# Patient Record
Sex: MALE | Race: WHITE | Employment: FULL TIME | RURAL
[De-identification: names, ages, dates, MRNs, and addresses within clinical notes are randomized per-mention and may not be internally consistent; named-entity substitution may affect disease eponyms.]

---

## 2018-09-26 PROBLEM — R97.20 ELEVATED PSA: Status: ACTIVE | Noted: 2018-09-26

## 2020-08-18 PROBLEM — C61 PROSTATE CANCER (HCC): Status: ACTIVE | Noted: 2020-08-18

## 2020-12-22 PROBLEM — D12.3 ADENOMATOUS POLYP OF TRANSVERSE COLON: Status: ACTIVE | Noted: 2019-03-01

## 2020-12-22 PROBLEM — Z12.11 SCREENING FOR COLON CANCER: Status: ACTIVE | Noted: 2019-03-13

## 2020-12-22 PROBLEM — Z86.010 HISTORY OF ADENOMATOUS POLYP OF COLON: Status: ACTIVE | Noted: 2019-03-13

## 2022-02-23 ENCOUNTER — APPOINTMENT (OUTPATIENT)
Dept: CT IMAGING | Age: 77
End: 2022-02-23
Attending: FAMILY MEDICINE
Payer: MEDICARE

## 2022-02-23 ENCOUNTER — HOSPITAL ENCOUNTER (EMERGENCY)
Age: 77
Discharge: HOME | End: 2022-02-23
Attending: FAMILY MEDICINE
Payer: MEDICARE

## 2022-02-23 VITALS
HEIGHT: 74 IN | RESPIRATION RATE: 18 BRPM | HEART RATE: 81 BPM | BODY MASS INDEX: 28.23 KG/M2 | SYSTOLIC BLOOD PRESSURE: 128 MMHG | TEMPERATURE: 98.1 F | DIASTOLIC BLOOD PRESSURE: 83 MMHG | WEIGHT: 220 LBS | OXYGEN SATURATION: 99 %

## 2022-02-23 DIAGNOSIS — M54.41 ACUTE MIDLINE LOW BACK PAIN WITH RIGHT-SIDED SCIATICA: Primary | ICD-10-CM

## 2022-02-23 LAB
ALBUMIN SERPL-MCNC: 4 G/DL (ref 3.5–5)
ALBUMIN/GLOB SERPL: 1.1 {RATIO} (ref 1.1–2.2)
ALP SERPL-CCNC: 94 U/L (ref 45–117)
ALT SERPL-CCNC: 32 U/L (ref 12–78)
ANION GAP SERPL CALC-SCNC: 9 MMOL/L (ref 5–15)
AST SERPL-CCNC: 30 U/L (ref 15–37)
BASOPHILS # BLD: 0 K/UL (ref 0–0.1)
BASOPHILS NFR BLD: 0 % (ref 0–1)
BILIRUB SERPL-MCNC: 0.8 MG/DL (ref 0.2–1)
BUN SERPL-MCNC: 25 MG/DL (ref 6–20)
BUN/CREAT SERPL: 21 (ref 12–20)
CALCIUM SERPL-MCNC: 9.3 MG/DL (ref 8.5–10.1)
CHLORIDE SERPL-SCNC: 105 MMOL/L (ref 97–108)
CO2 SERPL-SCNC: 23 MMOL/L (ref 21–32)
CREAT SERPL-MCNC: 1.17 MG/DL (ref 0.7–1.3)
DIFFERENTIAL METHOD BLD: ABNORMAL
EOSINOPHIL # BLD: 0.1 K/UL (ref 0–0.4)
EOSINOPHIL NFR BLD: 1 % (ref 0–7)
ERYTHROCYTE [DISTWIDTH] IN BLOOD BY AUTOMATED COUNT: 12.6 % (ref 11.5–14.5)
GLOBULIN SER CALC-MCNC: 3.5 G/DL (ref 2–4)
GLUCOSE SERPL-MCNC: 130 MG/DL (ref 65–100)
HCT VFR BLD AUTO: 37.8 % (ref 36.6–50.3)
HGB BLD-MCNC: 13.8 G/DL (ref 12.1–17)
IMM GRANULOCYTES # BLD AUTO: 0 K/UL (ref 0–0.04)
IMM GRANULOCYTES NFR BLD AUTO: 1 % (ref 0–0.5)
LYMPHOCYTES # BLD: 1 K/UL (ref 0.8–3.5)
LYMPHOCYTES NFR BLD: 16 % (ref 12–49)
MCH RBC QN AUTO: 34.3 PG (ref 26–34)
MCHC RBC AUTO-ENTMCNC: 36.5 G/DL (ref 30–36.5)
MCV RBC AUTO: 94 FL (ref 80–99)
MONOCYTES # BLD: 0.7 K/UL (ref 0–1)
MONOCYTES NFR BLD: 10 % (ref 5–13)
NEUTS SEG # BLD: 4.7 K/UL (ref 1.8–8)
NEUTS SEG NFR BLD: 72 % (ref 32–75)
NRBC # BLD: 0 K/UL (ref 0–0.01)
NRBC BLD-RTO: 0 PER 100 WBC
PLATELET # BLD AUTO: 168 K/UL (ref 150–400)
PMV BLD AUTO: 10.7 FL (ref 8.9–12.9)
POTASSIUM SERPL-SCNC: 4.1 MMOL/L (ref 3.5–5.1)
PROT SERPL-MCNC: 7.5 G/DL (ref 6.4–8.2)
RBC # BLD AUTO: 4.02 M/UL (ref 4.1–5.7)
SODIUM SERPL-SCNC: 137 MMOL/L (ref 136–145)
WBC # BLD AUTO: 6.5 K/UL (ref 4.1–11.1)

## 2022-02-23 PROCEDURE — 74011250637 HC RX REV CODE- 250/637: Performed by: FAMILY MEDICINE

## 2022-02-23 PROCEDURE — 74011250636 HC RX REV CODE- 250/636: Performed by: FAMILY MEDICINE

## 2022-02-23 PROCEDURE — 96374 THER/PROPH/DIAG INJ IV PUSH: CPT

## 2022-02-23 PROCEDURE — 74011000250 HC RX REV CODE- 250: Performed by: FAMILY MEDICINE

## 2022-02-23 PROCEDURE — 74176 CT ABD & PELVIS W/O CONTRAST: CPT

## 2022-02-23 PROCEDURE — 99284 EMERGENCY DEPT VISIT MOD MDM: CPT

## 2022-02-23 PROCEDURE — 80053 COMPREHEN METABOLIC PANEL: CPT

## 2022-02-23 PROCEDURE — 36415 COLL VENOUS BLD VENIPUNCTURE: CPT

## 2022-02-23 PROCEDURE — 85025 COMPLETE CBC W/AUTO DIFF WBC: CPT

## 2022-02-23 PROCEDURE — 96376 TX/PRO/DX INJ SAME DRUG ADON: CPT

## 2022-02-23 RX ORDER — ACETAMINOPHEN 325 MG/1
325 TABLET ORAL
COMMUNITY

## 2022-02-23 RX ORDER — OXYCODONE AND ACETAMINOPHEN 5; 325 MG/1; MG/1
1 TABLET ORAL
Status: COMPLETED | OUTPATIENT
Start: 2022-02-23 | End: 2022-02-23

## 2022-02-23 RX ORDER — OXYCODONE AND ACETAMINOPHEN 5; 325 MG/1; MG/1
1 TABLET ORAL
Qty: 12 TABLET | Refills: 0 | Status: SHIPPED | OUTPATIENT
Start: 2022-02-23 | End: 2022-02-26

## 2022-02-23 RX ORDER — CLOBETASOL PROPIONATE 0.5 MG/G
OINTMENT TOPICAL
COMMUNITY
Start: 2021-03-15

## 2022-02-23 RX ORDER — MELATONIN
1000 DAILY
COMMUNITY

## 2022-02-23 RX ORDER — MORPHINE SULFATE 4 MG/ML
4 INJECTION INTRAVENOUS
Status: COMPLETED | OUTPATIENT
Start: 2022-02-23 | End: 2022-02-23

## 2022-02-23 RX ORDER — SODIUM CHLORIDE 0.9 % (FLUSH) 0.9 %
5-10 SYRINGE (ML) INJECTION ONCE
Status: COMPLETED | OUTPATIENT
Start: 2022-02-23 | End: 2022-02-23

## 2022-02-23 RX ADMIN — OXYCODONE AND ACETAMINOPHEN 1 TABLET: 5; 325 TABLET ORAL at 23:39

## 2022-02-23 RX ADMIN — MORPHINE SULFATE 4 MG: 4 INJECTION INTRAVENOUS at 22:31

## 2022-02-23 RX ADMIN — SODIUM CHLORIDE, PRESERVATIVE FREE 10 ML: 5 INJECTION INTRAVENOUS at 21:08

## 2022-02-23 RX ADMIN — MORPHINE SULFATE 4 MG: 4 INJECTION INTRAVENOUS at 21:08

## 2022-02-24 NOTE — ED PROVIDER NOTES
[address] [place] [address]   EMERGENCY DEPARTMENT          Date: 2/23/2022  Patient: Micaela Osborne MRN: [id number]  SSN: [id number]    YOB: 1945  Age: 77 y.o. Sex: male      PCP: Leslye Shah DO  The patient arrived by ambulance and is accompanied by spouse. History of Presenting Illness     Chief Complaint   Patient presents with    LOW BACK PAIN       History Provided By: Patient and Patient's Wife    HPI: Micaela Osborne is a 77 y.o. male, pmhx atrial fibrillation, prostate cancer status post radiation therapy, who presents via ambulance to the ED c/o low back pain. Patient has had back pain for several weeks but is never been severe tonight. He states that this pain is usually fairly well controlled with over-the-counter medications. Tonight he was standing at a bar getting a drink, turned around and felt immediate pain in his back with radiation into his legs. He was unable to walk easily. He was assisted back to his truck and drove home. At home the back pain continued got somewhat worse. He had no incontinence of urine or stool or inability to urinate. He had no numbness or tingling in the lower extremities. Pain is brought to hospital by ambulance due to severe back pain. Any movement causes excruciating pain he states. He has had no fever, sweats, chills, there is no history of IV drug use. Patient is on Eliquis for his atrial fibrillation but has had no bleeding problems recently. No abdominal pain is noted. No chest pain heaviness pressure or shortness of breath is noted. No recent fall or overuse is noted. Past History     Past Medical History:   Diagnosis Date    A-fib ([address]     Acid reflux     Cardiac abnormality     Chronic back pain     Elevated PSA     Hyperlipemia        Past Surgical History:   Procedure Laterality Date    HX VASECTOMY         No family history on file.     Social History     Tobacco Use    Smoking status: Current Every Day Smoker     Types: Cigars    Smokeless tobacco: Never Used   Substance Use Topics    Alcohol use: Yes     Alcohol/week: 4.0 standard drinks     Types: 4 Glasses of wine per week    Drug use: No       Allergies   Allergen Reactions    Finasteride Rash       Current Outpatient Medications   Medication Sig Dispense Refill    clobetasoL (TEMOVATE) 0.05 % ointment       oxyCODONE-acetaminophen (Percocet) 5-325 mg per tablet Take 1 Tablet by mouth every six (6) hours as needed for Pain for up to 3 days. Max Daily Amount: 4 Tablets. 12 Tablet 0    leuprolide acetate (ELIGARD SC) by SubCUTAneous route.  acetaminophen (TYLENOL) 325 mg tablet Take 325 mg by mouth every four (4) hours as needed.  cholecalciferol (VITAMIN D3) (1000 Units /25 mcg) tablet Take 1,000 Units by mouth daily.  flecainide (TAMBOCOR) 50 mg tablet       metoprolol succinate (TOPROL-XL) 50 mg XL tablet 25 mg.      apixaban (Eliquis) 5 mg tablet Take  by mouth two (2) times a day.  esomeprazole (NEXIUM) 20 mg capsule 20 mg.      rosuvastatin (CRESTOR) 5 mg tablet TAKE 1 TAB BY MOUTH EVERY NIGHT AT BEDTIME  3         Review of Systems       Review of Systems   Constitutional: Negative for activity change, chills, diaphoresis and fever. HENT: Negative. Respiratory: Negative for chest tightness and shortness of breath. Cardiovascular: Negative for chest pain and palpitations. Gastrointestinal: Negative for abdominal pain, nausea and vomiting. Genitourinary: Negative for difficulty urinating, dysuria, flank pain, frequency and hematuria. Musculoskeletal: Positive for back pain, gait problem and myalgias. Skin: Negative. Neurological: Negative for weakness and numbness. Hematological: Does not bruise/bleed easily. Physical Exam     Physical Exam  Vitals and nursing note reviewed. Constitutional:       General: He is in acute distress. Appearance: Normal appearance. He is normal weight.  He is not toxic-appearing or diaphoretic. HENT:      Head: Normocephalic and atraumatic. Right Ear: External ear normal.      Left Ear: External ear normal.      Nose: Nose normal.      Mouth/Throat:      Mouth: Mucous membranes are moist.   Eyes:      Extraocular Movements: Extraocular movements intact. Conjunctiva/sclera: Conjunctivae normal.      Pupils: Pupils are equal, round, and reactive to light. Cardiovascular:      Rate and Rhythm: Normal rate and regular rhythm. Pulses: Normal pulses. Heart sounds: Normal heart sounds. No murmur heard. No friction rub. No gallop. Pulmonary:      Effort: Pulmonary effort is normal.      Breath sounds: Normal breath sounds. Abdominal:      General: Bowel sounds are normal. There is no distension. Palpations: Abdomen is soft. Tenderness: There is no abdominal tenderness. There is no guarding. Musculoskeletal:         General: Tenderness present. No signs of injury. Cervical back: Normal range of motion and neck supple. No rigidity or tenderness. Right lower leg: No edema. Left lower leg: No edema. Comments: Back with straight with some loss of lordosis and pain to palpation over the L to L3 area. Range of motion is limited secondary to pain. Any motion of patient and cause a lot of pain is area. Lower extremities had 2+ DTRs in the ankles and knees intact sensation in the saddle area and throughout both lower extremities was noted. Straight leg raise on the right slightly +30 degrees, negative on the left. Pain not worse with forced dorsiflexion of the ankle. 2+ DP and PT pulses in the legs noted. No bruising or erythema of the back is noted. Skin:     General: Skin is warm and dry. Capillary Refill: Capillary refill takes less than 2 seconds. Findings: No erythema or rash. Neurological:      Mental Status: He is alert and oriented to person, place, and time.       Cranial Nerves: No cranial nerve deficit. Sensory: No sensory deficit. Motor: No weakness. Gait: Gait abnormal.      Deep Tendon Reflexes: Reflexes abnormal.   Psychiatric:         Mood and Affect: Mood normal.         Behavior: Behavior normal.         Thought Content: Thought content normal.         Judgment: Judgment normal.           Diagnostic Study Results     Labs -     Recent Results (from the past 48 hour(s))   CBC WITH AUTOMATED DIFF    Collection Time: 02/23/22  9:05 PM   Result Value Ref Range    WBC 6.5 4.1 - 11.1 K/uL    RBC 4.02 (L) 4.10 - 5.70 M/uL    HGB 13.8 12.1 - 17.0 g/dL    HCT 37.8 36.6 - 50.3 %    MCV 94.0 80.0 - 99.0 FL    MCH 34.3 (H) 26.0 - 34.0 PG    MCHC 36.5 30.0 - 36.5 g/dL    RDW 12.6 11.5 - 14.5 %    PLATELET 286 748 - 329 K/uL    MPV 10.7 8.9 - 12.9 FL    NRBC 0.0 0  WBC    ABSOLUTE NRBC 0.00 0.00 - 0.01 K/uL    NEUTROPHILS 72 32 - 75 %    LYMPHOCYTES 16 12 - 49 %    MONOCYTES 10 5 - 13 %    EOSINOPHILS 1 0 - 7 %    BASOPHILS 0 0 - 1 %    IMMATURE GRANULOCYTES 1 (H) 0.0 - 0.5 %    ABS. NEUTROPHILS 4.7 1.8 - 8.0 K/UL    ABS. LYMPHOCYTES 1.0 0.8 - 3.5 K/UL    ABS. MONOCYTES 0.7 0.0 - 1.0 K/UL    ABS. EOSINOPHILS 0.1 0.0 - 0.4 K/UL    ABS. BASOPHILS 0.0 0.0 - 0.1 K/UL    ABS. IMM. GRANS. 0.0 0.00 - 0.04 K/UL    DF AUTOMATED     METABOLIC PANEL, COMPREHENSIVE    Collection Time: 02/23/22  9:05 PM   Result Value Ref Range    Sodium 137 136 - 145 mmol/L    Potassium 4.1 3.5 - 5.1 mmol/L    Chloride 105 97 - 108 mmol/L    CO2 23 21 - 32 mmol/L    Anion gap 9 5 - 15 mmol/L    Glucose 130 (H) 65 - 100 mg/dL    BUN 25 (H) 6 - 20 MG/DL    Creatinine 1.17 0.70 - 1.30 MG/DL    BUN/Creatinine ratio 21 (H) 12 - 20      GFR est AA >60 >60 ml/min/1.73m2    GFR est non-AA >60 >60 ml/min/1.73m2    Calcium 9.3 8.5 - 10.1 MG/DL    Bilirubin, total 0.8 0.2 - 1.0 MG/DL    ALT (SGPT) 32 12 - 78 U/L    AST (SGOT) 30 15 - 37 U/L    Alk.  phosphatase 94 45 - 117 U/L    Protein, total 7.5 6.4 - 8.2 g/dL    Albumin 4.0 3.5 - 5.0 g/dL    Globulin 3.5 2.0 - 4.0 g/dL    A-G Ratio 1.1 1.1 - 2.2          Radiologic Studies -   CT Results  (Last 48 hours)               02/23/22 2130  CT ABD PELV WO CONT Final result    Impression:  No acute findings seen. Narrative:  EXAM: CT ABD PELV WO CONT       INDICATION: prostate ca on eliquis, with low back pain, ? lumbar mets, hematoma       COMPARISON:       IV CONTRAST: None. ORAL CONTRAST: None       TECHNIQUE:    Thin axial images were obtained through the abdomen and pelvis. Coronal and   sagittal reformats were generated. CT dose reduction was achieved through use of   a standardized protocol tailored for this examination and automatic exposure   control for dose modulation. The absence of intravenous contrast material reduces the sensitivity for   evaluation of the vasculature and solid organs. FINDINGS:    LOWER THORAX: No significant abnormality in the incidentally imaged lower chest.   LIVER: No mass. BILIARY TREE: Cholelithiasis in a nondistended gallbladder. . CBD is not dilated. SPLEEN: within normal limits. PANCREAS: No focal abnormality. ADRENALS: Unremarkable. KIDNEYS/URETERS: No calculus or hydronephrosis. STOMACH: Unremarkable. SMALL BOWEL: No dilatation or wall thickening. COLON: No dilatation or wall thickening. Sigmoid diverticulosis of. APPENDIX:   PERITONEUM: No ascites or pneumoperitoneum. RETROPERITONEUM: No lymphadenopathy or aortic aneurysm. REPRODUCTIVE ORGANS:   URINARY BLADDER: No mass or calculus. BONES: No destructive bone lesion. ABDOMINAL WALL: No mass or hernia. ADDITIONAL COMMENTS: N/A               CT ABD PELV WO CONT   Final Result   No acute findings seen.             Procedures     Procedures      Medical Decision Making     Records Reviewed: Nursing notes past medical records x-rays and lab studies         Vital Signs  Patient Vitals for the past 24 hrs:   Pulse Resp BP SpO2   02/23/22 2242 81 18 128/83 99 % Pulse Oximetry Analysis - 99% on RA    I am the first provider for this patient. I reviewed the vital signs, available nursing notes, past medical history, past surgical history, family history and social history, performed a physical exam and reviewed labs and xrays from this visit    MDM  Number of Diagnoses or Management Options  Acute midline low back pain with right-sided sciatica: established, worsening     Amount and/or Complexity of Data Reviewed  Clinical lab tests: ordered and reviewed  Tests in the radiology section of CPT®: ordered  Tests in the medicine section of CPT®: ordered and reviewed  Obtain history from someone other than the patient: yes (Wife)  Review and summarize past medical records: yes    Risk of Complications, Morbidity, and/or Mortality  Presenting problems: high  Diagnostic procedures: high  Management options: moderate  General comments: Differential includes metastatic disease, fracture, sprain, strain, herniated disc, spinal cord hematoma or abscess,    Patient Progress  Patient progress: improved    ED Course     Initial assessment performed. The patients presenting problems have been discussed, and they are in agreement with the care plan formulated and outlined with them. I have encouraged them to ask questions as they arise throughout their visit. ED Course as of 02/24/22 2211 Wed Feb 23, 2022 2226  review is negative for medication prescription. [PS]   9166 CBC interval is normal with white count 6.5, hemoglobin 13.8 platelet count 613, CMP normal except for glucose of 130 and BUN of 25. Sodium was 137, potassium 4.1, CO2 is 23, creatinine 1.17, liver function studies were normal.  CT revealed no bony lesions, gallstones were noted without dilated, no acute process was noted. [PS]   1271 Patient's back pain initially was fairly controlled on 1 dose of morphine. Pain returned he was given a second dose of morphine. Will reevaluate.   Scan is without evidence of hematoma or abscess or bony metastases. [PS]   2305 Pain much improved with medications. Patient able to sit up stand and put his pants on. Will discharge home follow-up with his primary care doctor in 1 day to discuss possible need for MRI and further treatment. Will discharge home on Percocet for pain. [PS]   Thu Feb 24, 2022 2207 Discussed the possibility of an occult hematoma with the patient as he is on Eliquis and his pain is getting worse if he has any neurologic symptoms he knows to follow-up with MD or ER soon as possible. No evidence of this was noted on CT scan today. Also discussed the presence of gallstones with the patient. No evidence of metastatic disease noted by radiologist today. [PS]      ED Course User Index  [PS] Lucho Roca MD        Orders Placed This Encounter    CT ABD PELV WO CONT    CBC WITH AUTOMATED DIFF    CMP    leuprolide acetate (ELIGARD SC)    acetaminophen (TYLENOL) 325 mg tablet    cholecalciferol (VITAMIN D3) (1000 Units /25 mcg) tablet    clobetasoL (TEMOVATE) 0.05 % ointment    sodium chloride (NS) flush 5-10 mL    morphine injection 4 mg    morphine injection 4 mg    oxyCODONE-acetaminophen (Percocet) 5-325 mg per tablet    oxyCODONE-acetaminophen (PERCOCET) 5-325 mg per tablet 1 Tablet       MEDICATIONS GIVEN:    Medications   sodium chloride (NS) flush 5-10 mL (10 mL IntraVENous Given 2/23/22 2108)   morphine injection 4 mg (4 mg IntraVENous Given 2/23/22 2108)   morphine injection 4 mg (4 mg IntraVENous Given 2/23/22 2231)   oxyCODONE-acetaminophen (PERCOCET) 5-325 mg per tablet 1 Tablet (1 Tablet Oral Given 2/23/22 2339)         Diagnosis     Clinical Impression:   1.  Acute midline low back pain with right-sided sciatica            Disposition       Orders Placed This Encounter    CT ABD PELV WO CONT    CBC WITH AUTOMATED DIFF    CMP    leuprolide acetate (ELIGARD SC)    acetaminophen (TYLENOL) 325 mg tablet    cholecalciferol (VITAMIN D3) (1000 Units /25 mcg) tablet    clobetasoL (TEMOVATE) 0.05 % ointment    sodium chloride (NS) flush 5-10 mL    morphine injection 4 mg    morphine injection 4 mg    oxyCODONE-acetaminophen (Percocet) 5-325 mg per tablet    oxyCODONE-acetaminophen (PERCOCET) 5-325 mg per tablet 1 Tablet         MEDICATIONS GIVEN:    No current facility-administered medications for this encounter. Current Outpatient Medications   Medication Sig    clobetasoL (TEMOVATE) 0.05 % ointment     oxyCODONE-acetaminophen (Percocet) 5-325 mg per tablet Take 1 Tablet by mouth every six (6) hours as needed for Pain for up to 3 days. Max Daily Amount: 4 Tablets.  leuprolide acetate (ELIGARD SC) by SubCUTAneous route.  acetaminophen (TYLENOL) 325 mg tablet Take 325 mg by mouth every four (4) hours as needed.  cholecalciferol (VITAMIN D3) (1000 Units /25 mcg) tablet Take 1,000 Units by mouth daily.  flecainide (TAMBOCOR) 50 mg tablet     metoprolol succinate (TOPROL-XL) 50 mg XL tablet 25 mg.    apixaban (Eliquis) 5 mg tablet Take  by mouth two (2) times a day.  esomeprazole (NEXIUM) 20 mg capsule 20 mg.    rosuvastatin (CRESTOR) 5 mg tablet TAKE 1 TAB BY MOUTH EVERY NIGHT AT BEDTIME       No data found. Medications   sodium chloride (NS) flush 5-10 mL (10 mL IntraVENous Given 2/23/22 2108)   morphine injection 4 mg (4 mg IntraVENous Given 2/23/22 2108)   morphine injection 4 mg (4 mg IntraVENous Given 2/23/22 2231)   oxyCODONE-acetaminophen (PERCOCET) 5-325 mg per tablet 1 Tablet (1 Tablet Oral Given 2/23/22 2339)       Discharge note:    I have reviewed all pertinent and currently available diagnostic test results for this visit including, but not limited to, labs, xrays, and EKGs. I have reviewed all pertinent and currently available medical records.  My plan of care and further evaluation and/or disposition is based on these results, as well as the initial, and subsequent, history and physical exam, as well as any additional complaints during the visit. Pt has been re-examined, appears to be stable states that they are feeling better and have no new complaints. Patient has nontoxic appearance and condition is stable for discharge. Laboratory tests, medications, x-rays, diagnosis, follow up plan and return instructions have been reviewed and discussed with the patient and/or family. Pt and/or family were instructed on symptoms that may arise after discharge requiring re-evaluation by a physician. Pt and/or family have had the opportunity to ask questions about their care. Patient and/or family verbalized understanding and agreement with care plan, follow up and return instructions. Patient and/or family agree to return if their symptoms are not improving or immediately if they have any change in their condition. I have also put together some discharge instructions for the patient that include: 1) educational information regarding their diagnosis, 2) how to care for their diagnosis at home, as well a 3) list of reasons why they would want to return to the ED prior to their follow-up appointment, should their condition change as well as instructions to return to the ED should sx worsen at any time. Vital signs stable for discharge. Yolanda Pineda MD      Disposition     Clinical Impression:     ICD-10-CM ICD-9-CM    1. Acute midline low back pain with right-sided sciatica  M54.41 724.2 oxyCODONE-acetaminophen (Percocet) 5-325 mg per tablet     724.3          PLAN:  1. Discharge home with wife in stable condition  2. Discharge Medication List as of 2/23/2022 11:09 PM      START taking these medications    Details   oxyCODONE-acetaminophen (Percocet) 5-325 mg per tablet Take 1 Tablet by mouth every six (6) hours as needed for Pain for up to 3 days.  Max Daily Amount: 4 Tablets., Normal, Disp-12 Tablet, R-0         CONTINUE these medications which have NOT CHANGED    Details   clobetasoL (TEMOVATE) 0.05 % ointment Historical Med      leuprolide acetate (ELIGARD SC) by SubCUTAneous route., Historical Med      acetaminophen (TYLENOL) 325 mg tablet Take 325 mg by mouth every four (4) hours as needed., Historical Med      cholecalciferol (VITAMIN D3) (1000 Units /25 mcg) tablet Take 1,000 Units by mouth daily. , Historical Med      flecainide (TAMBOCOR) 50 mg tablet Historical Med      metoprolol succinate (TOPROL-XL) 50 mg XL tablet 25 mg., Historical Med      apixaban (Eliquis) 5 mg tablet Take  by mouth two (2) times a day., Historical Med      esomeprazole (NEXIUM) 20 mg capsule 20 mg., Historical Med      rosuvastatin (CRESTOR) 5 mg tablet TAKE 1 TAB BY MOUTH EVERY NIGHT AT BEDTIME, Historical Med, R-3           3. Follow-up Information     Follow up With Specialties Details Why Contact Info    Rolf Ibarra, DO Family Medicine Call in 1 day Follow up todays symptoms. [address] [facility]          4. Discharged    Return to ED if worse    Please note, this dictation was completed with [employer], the [employer] voice recognition software. Quite often unanticipated grammatical, syntax, homophones, and other interpretive errors are inadvertently transcribed by the computer software. Please disregard these errors. Please excuse any errors that have escaped final proof reading.

## 2022-02-24 NOTE — ED NOTES
I have reviewed discharge instructions with the patient and spouse. The patient and spouse verbalized understanding. Pt ambulated to car with supervision, wife driving home.

## 2022-02-24 NOTE — ED TRIAGE NOTES
Chronic back pain . Today was doing a lot of bending to do yard work and when he stood and twisted he had sudden onset of severe pain and muscle spasms- difficulty ambulating do to pain.  States he took tylenol and pain decreased from 10 .states less pain with movement-unable to localize pain on palpation

## 2022-02-24 NOTE — DISCHARGE INSTRUCTIONS
Percocet as needed for pain. Heat to areas that hurt 20 minutes there while awake. Return for loss of bladder or bowel control or inability to start urine, uncontrolled pain, increasing weakness or numbness in lower extremities. Or your primary care doctor in 1 day to discuss further treatment and evaluation.

## 2022-09-28 PROBLEM — D69.6 PLATELETS DECREASED (HCC): Status: ACTIVE | Noted: 2021-11-02

## 2022-09-28 PROBLEM — K80.20 CALCULUS OF GALLBLADDER WITHOUT CHOLECYSTITIS WITHOUT OBSTRUCTION: Status: ACTIVE | Noted: 2022-02-25
